# Patient Record
Sex: FEMALE | Race: WHITE | NOT HISPANIC OR LATINO | URBAN - METROPOLITAN AREA
[De-identification: names, ages, dates, MRNs, and addresses within clinical notes are randomized per-mention and may not be internally consistent; named-entity substitution may affect disease eponyms.]

---

## 2017-06-27 ENCOUNTER — OFFICE VISIT - HEALTHEAST (OUTPATIENT)
Dept: SURGERY | Facility: CLINIC | Age: 32
End: 2017-06-27

## 2017-06-27 ENCOUNTER — AMBULATORY - HEALTHEAST (OUTPATIENT)
Dept: LAB | Facility: CLINIC | Age: 32
End: 2017-06-27

## 2017-06-27 DIAGNOSIS — J45.909 ASTHMA: ICD-10-CM

## 2017-06-27 DIAGNOSIS — K91.2 POSTOPERATIVE MALABSORPTION: ICD-10-CM

## 2017-06-27 DIAGNOSIS — F17.200 TOBACCO USE DISORDER: ICD-10-CM

## 2017-06-27 DIAGNOSIS — E66.9 OBESITY (BMI 30-39.9): ICD-10-CM

## 2017-06-27 LAB
CHOLEST SERPL-MCNC: 151 MG/DL
FASTING STATUS PATIENT QL REPORTED: NO
HDLC SERPL-MCNC: 45 MG/DL
LDLC SERPL CALC-MCNC: 85 MG/DL
TRIGL SERPL-MCNC: 106 MG/DL

## 2017-06-27 ASSESSMENT — MIFFLIN-ST. JEOR: SCORE: 1993.16

## 2017-06-28 ENCOUNTER — COMMUNICATION - HEALTHEAST (OUTPATIENT)
Dept: SURGERY | Facility: CLINIC | Age: 32
End: 2017-06-28

## 2017-06-28 ENCOUNTER — AMBULATORY - HEALTHEAST (OUTPATIENT)
Dept: SURGERY | Facility: CLINIC | Age: 32
End: 2017-06-28

## 2017-06-28 DIAGNOSIS — K91.2 POSTOPERATIVE MALABSORPTION: ICD-10-CM

## 2017-06-28 LAB — HBA1C MFR BLD: 4.8 % (ref 4.2–6.1)

## 2017-07-14 ENCOUNTER — COMMUNICATION - HEALTHEAST (OUTPATIENT)
Dept: SURGERY | Facility: CLINIC | Age: 32
End: 2017-07-14

## 2017-07-18 ENCOUNTER — COMMUNICATION - HEALTHEAST (OUTPATIENT)
Dept: SURGERY | Facility: CLINIC | Age: 32
End: 2017-07-18

## 2017-07-19 ENCOUNTER — AMBULATORY - HEALTHEAST (OUTPATIENT)
Dept: SURGERY | Facility: CLINIC | Age: 32
End: 2017-07-19

## 2017-07-19 DIAGNOSIS — Z72.0 TOBACCO USE: ICD-10-CM

## 2017-08-05 ENCOUNTER — HEALTH MAINTENANCE LETTER (OUTPATIENT)
Age: 32
End: 2017-08-05

## 2017-11-15 ENCOUNTER — AMBULATORY - HEALTHEAST (OUTPATIENT)
Dept: SURGERY | Facility: CLINIC | Age: 32
End: 2017-11-15

## 2017-11-15 DIAGNOSIS — Z72.0 TOBACCO USE: ICD-10-CM

## 2018-03-14 ENCOUNTER — COMMUNICATION - HEALTHEAST (OUTPATIENT)
Dept: SURGERY | Facility: CLINIC | Age: 33
End: 2018-03-14

## 2018-03-14 DIAGNOSIS — J45.909 ASTHMA: ICD-10-CM

## 2018-03-17 ENCOUNTER — COMMUNICATION - HEALTHEAST (OUTPATIENT)
Dept: SURGERY | Facility: CLINIC | Age: 33
End: 2018-03-17

## 2018-03-17 DIAGNOSIS — J45.909 ASTHMA: ICD-10-CM

## 2018-08-13 ENCOUNTER — COMMUNICATION - HEALTHEAST (OUTPATIENT)
Dept: SURGERY | Facility: CLINIC | Age: 33
End: 2018-08-13

## 2018-08-13 DIAGNOSIS — K90.9 INTESTINAL MALABSORPTION: ICD-10-CM

## 2018-08-13 DIAGNOSIS — E66.9 OBESITY (BMI 30-39.9): ICD-10-CM

## 2018-08-13 DIAGNOSIS — K91.2 POSTOPERATIVE MALABSORPTION: ICD-10-CM

## 2018-08-16 ENCOUNTER — COMMUNICATION - HEALTHEAST (OUTPATIENT)
Dept: SURGERY | Facility: CLINIC | Age: 33
End: 2018-08-16

## 2018-08-16 ENCOUNTER — RECORDS - HEALTHEAST (OUTPATIENT)
Dept: ADMINISTRATIVE | Facility: OTHER | Age: 33
End: 2018-08-16

## 2019-04-23 ENCOUNTER — COMMUNICATION - HEALTHEAST (OUTPATIENT)
Dept: SURGERY | Facility: CLINIC | Age: 34
End: 2019-04-23

## 2020-08-11 ENCOUNTER — AMBULATORY - HEALTHEAST (OUTPATIENT)
Dept: SURGERY | Facility: CLINIC | Age: 35
End: 2020-08-11

## 2020-08-12 ENCOUNTER — OFFICE VISIT - HEALTHEAST (OUTPATIENT)
Dept: SURGERY | Facility: CLINIC | Age: 35
End: 2020-08-12

## 2020-08-12 ENCOUNTER — AMBULATORY - HEALTHEAST (OUTPATIENT)
Dept: LAB | Facility: CLINIC | Age: 35
End: 2020-08-12

## 2020-08-12 DIAGNOSIS — F17.200 TOBACCO USE DISORDER: ICD-10-CM

## 2020-08-12 DIAGNOSIS — K91.2 POSTOPERATIVE MALABSORPTION: ICD-10-CM

## 2020-08-12 DIAGNOSIS — Z72.0 TOBACCO USE: ICD-10-CM

## 2020-08-12 DIAGNOSIS — E66.01 MORBID OBESITY (H): ICD-10-CM

## 2020-08-12 LAB
ALBUMIN SERPL-MCNC: 3.5 G/DL (ref 3.5–5)
ALP SERPL-CCNC: 77 U/L (ref 45–120)
ALT SERPL W P-5'-P-CCNC: 20 U/L (ref 0–45)
ANION GAP SERPL CALCULATED.3IONS-SCNC: 11 MMOL/L (ref 5–18)
AST SERPL W P-5'-P-CCNC: 18 U/L (ref 0–40)
BILIRUB SERPL-MCNC: 0.4 MG/DL (ref 0–1)
BUN SERPL-MCNC: 9 MG/DL (ref 8–22)
CALCIUM SERPL-MCNC: 8.6 MG/DL (ref 8.5–10.5)
CHLORIDE BLD-SCNC: 107 MMOL/L (ref 98–107)
CO2 SERPL-SCNC: 21 MMOL/L (ref 22–31)
CREAT SERPL-MCNC: 0.71 MG/DL (ref 0.6–1.1)
ERYTHROCYTE [DISTWIDTH] IN BLOOD BY AUTOMATED COUNT: 13.3 % (ref 11–14.5)
FERRITIN SERPL-MCNC: 24 NG/ML (ref 10–130)
FOLATE SERPL-MCNC: 14.8 NG/ML
GFR SERPL CREATININE-BSD FRML MDRD: >60 ML/MIN/1.73M2
GLUCOSE BLD-MCNC: 93 MG/DL (ref 70–125)
HBA1C MFR BLD: 5.4 %
HCT VFR BLD AUTO: 40.6 % (ref 35–47)
HGB BLD-MCNC: 14.1 G/DL (ref 12–16)
MCH RBC QN AUTO: 29.8 PG (ref 27–34)
MCHC RBC AUTO-ENTMCNC: 34.8 G/DL (ref 32–36)
MCV RBC AUTO: 86 FL (ref 80–100)
PLATELET # BLD AUTO: 228 THOU/UL (ref 140–440)
PMV BLD AUTO: 8.6 FL (ref 7–10)
POTASSIUM BLD-SCNC: 4.1 MMOL/L (ref 3.5–5)
PROT SERPL-MCNC: 6.2 G/DL (ref 6–8)
PTH-INTACT SERPL-MCNC: 138 PG/ML (ref 10–86)
RBC # BLD AUTO: 4.73 MILL/UL (ref 3.8–5.4)
SODIUM SERPL-SCNC: 139 MMOL/L (ref 136–145)
TSH SERPL DL<=0.005 MIU/L-ACNC: 1.76 UIU/ML (ref 0.3–5)
VIT B12 SERPL-MCNC: 1000 PG/ML (ref 213–816)
WBC: 6.2 THOU/UL (ref 4–11)

## 2020-08-12 ASSESSMENT — MIFFLIN-ST. JEOR: SCORE: 2360.58

## 2020-08-13 LAB
25(OH)D3 SERPL-MCNC: 14 NG/ML (ref 30–80)
25(OH)D3 SERPL-MCNC: 14 NG/ML (ref 30–80)

## 2020-08-15 LAB
ANNOTATION COMMENT IMP: NORMAL
VIT A SERPL-MCNC: 0.51 MG/L (ref 0.3–1.2)
VIT B1 PYROPHOSHATE BLD-SCNC: 92 NMOL/L (ref 70–180)
VITAMIN A (RETINYL PALMITATE): <0.02 MG/L (ref 0–0.1)

## 2020-08-18 ENCOUNTER — COMMUNICATION - HEALTHEAST (OUTPATIENT)
Dept: SURGERY | Facility: CLINIC | Age: 35
End: 2020-08-18

## 2020-08-18 LAB — ZINC SERPL-MCNC: 62.1 UG/DL (ref 60–120)

## 2020-08-19 ENCOUNTER — COMMUNICATION - HEALTHEAST (OUTPATIENT)
Dept: SURGERY | Facility: CLINIC | Age: 35
End: 2020-08-19

## 2020-08-19 ENCOUNTER — OFFICE VISIT - HEALTHEAST (OUTPATIENT)
Dept: SURGERY | Facility: CLINIC | Age: 35
End: 2020-08-19

## 2020-08-19 DIAGNOSIS — E66.01 OBESITY, CLASS III, BMI 40-49.9 (MORBID OBESITY) (H): ICD-10-CM

## 2020-08-19 DIAGNOSIS — Z98.84 BARIATRIC SURGERY STATUS: ICD-10-CM

## 2020-08-19 DIAGNOSIS — Z71.3 NUTRITIONAL COUNSELING: ICD-10-CM

## 2020-08-19 ASSESSMENT — MIFFLIN-ST. JEOR: SCORE: 2346.97

## 2020-08-26 ENCOUNTER — HOSPITAL ENCOUNTER (OUTPATIENT)
Dept: RADIOLOGY | Facility: CLINIC | Age: 35
Discharge: HOME | End: 2020-08-26
Attending: FAMILY MEDICINE

## 2020-08-26 DIAGNOSIS — E66.01 MORBID OBESITY (H): ICD-10-CM

## 2020-09-24 LAB
CHLAMYDIA_EXT- HISTORICAL: NEGATIVE
SPECIMEN DESCRIPTION_EXT (HISTORICAL CONVERSION): NORMAL

## 2020-10-06 ENCOUNTER — COMMUNICATION - HEALTHEAST (OUTPATIENT)
Dept: SURGERY | Facility: CLINIC | Age: 35
End: 2020-10-06

## 2020-12-29 ENCOUNTER — COMMUNICATION - HEALTHEAST (OUTPATIENT)
Dept: SURGERY | Facility: CLINIC | Age: 35
End: 2020-12-29

## 2020-12-29 ENCOUNTER — OFFICE VISIT - HEALTHEAST (OUTPATIENT)
Dept: SURGERY | Facility: CLINIC | Age: 35
End: 2020-12-29

## 2020-12-29 DIAGNOSIS — K91.2 POSTOPERATIVE MALABSORPTION: ICD-10-CM

## 2020-12-29 DIAGNOSIS — E66.01 MORBID OBESITY (H): ICD-10-CM

## 2021-02-12 ENCOUNTER — AMBULATORY - HEALTHEAST (OUTPATIENT)
Dept: SURGERY | Facility: CLINIC | Age: 36
End: 2021-02-12

## 2021-02-12 DIAGNOSIS — Z11.59 ENCOUNTER FOR SCREENING FOR OTHER VIRAL DISEASES: ICD-10-CM

## 2021-02-18 LAB
HPV_EXT - HISTORICAL: ABNORMAL
PAP SMEAR - HIM PATIENT REPORTED: NORMAL

## 2021-02-23 ENCOUNTER — AMBULATORY - HEALTHEAST (OUTPATIENT)
Dept: LAB | Facility: CLINIC | Age: 36
End: 2021-02-23

## 2021-02-23 DIAGNOSIS — Z11.59 ENCOUNTER FOR SCREENING FOR OTHER VIRAL DISEASES: ICD-10-CM

## 2021-02-23 LAB
SARS-COV-2 PCR COMMENT: NORMAL
SARS-COV-2 RNA SPEC QL NAA+PROBE: NEGATIVE
SARS-COV-2 VIRUS SPECIMEN SOURCE: NORMAL

## 2021-02-23 ASSESSMENT — MIFFLIN-ST. JEOR: SCORE: 2365.11

## 2021-02-24 ENCOUNTER — COMMUNICATION - HEALTHEAST (OUTPATIENT)
Dept: SCHEDULING | Facility: CLINIC | Age: 36
End: 2021-02-24

## 2021-02-26 ENCOUNTER — ANESTHESIA - HEALTHEAST (OUTPATIENT)
Dept: SURGERY | Facility: CLINIC | Age: 36
End: 2021-02-26

## 2021-02-26 ENCOUNTER — SURGERY - HEALTHEAST (OUTPATIENT)
Dept: SURGERY | Facility: CLINIC | Age: 36
End: 2021-02-26

## 2021-02-26 ASSESSMENT — MIFFLIN-ST. JEOR: SCORE: 2356.89

## 2021-03-20 ENCOUNTER — COMMUNICATION - HEALTHEAST (OUTPATIENT)
Dept: SCHEDULING | Facility: CLINIC | Age: 36
End: 2021-03-20

## 2021-05-04 ENCOUNTER — AMBULATORY - HEALTHEAST (OUTPATIENT)
Dept: MULTI SPECIALTY CLINIC | Facility: CLINIC | Age: 36
End: 2021-05-04

## 2021-05-04 LAB
ALT SERPL W/O P-5'-P-CCNC: 29 IU/L (ref 8–45)
AST SERPL-CCNC: 23 IU/L (ref 2–40)
CREAT SERPL-MCNC: 0.74 MG/DL (ref 0.57–1.11)
GFR ESTIMATE EXT - HISTORICAL: >60 ML/MIN/1.73M2
GFR ESTIMATE, IF BLACK EXT - HISTORICAL: >60 ML/MIN/1.73M2

## 2021-05-26 ENCOUNTER — AMBULATORY - HEALTHEAST (OUTPATIENT)
Dept: SURGERY | Facility: CLINIC | Age: 36
End: 2021-05-26

## 2021-05-26 DIAGNOSIS — Z11.59 ENCOUNTER FOR SCREENING FOR OTHER VIRAL DISEASES: ICD-10-CM

## 2021-05-27 ENCOUNTER — RECORDS - HEALTHEAST (OUTPATIENT)
Dept: ADMINISTRATIVE | Facility: OTHER | Age: 36
End: 2021-05-27

## 2021-05-27 VITALS
DIASTOLIC BLOOD PRESSURE: 70 MMHG | SYSTOLIC BLOOD PRESSURE: 124 MMHG | HEIGHT: 71 IN | WEIGHT: 293 LBS | BODY MASS INDEX: 41.02 KG/M2

## 2021-05-30 ENCOUNTER — RECORDS - HEALTHEAST (OUTPATIENT)
Dept: ADMINISTRATIVE | Facility: CLINIC | Age: 36
End: 2021-05-30

## 2021-05-30 ENCOUNTER — HEALTH MAINTENANCE LETTER (OUTPATIENT)
Age: 36
End: 2021-05-30

## 2021-05-31 ENCOUNTER — RECORDS - HEALTHEAST (OUTPATIENT)
Dept: ADMINISTRATIVE | Facility: CLINIC | Age: 36
End: 2021-05-31

## 2021-05-31 VITALS — HEIGHT: 71 IN | WEIGHT: 265 LBS | BODY MASS INDEX: 37.1 KG/M2

## 2021-06-02 ENCOUNTER — RECORDS - HEALTHEAST (OUTPATIENT)
Dept: ADMINISTRATIVE | Facility: CLINIC | Age: 36
End: 2021-06-02

## 2021-06-02 ENCOUNTER — RECORDS - HEALTHEAST (OUTPATIENT)
Dept: ADMINISTRATIVE | Facility: OTHER | Age: 36
End: 2021-06-02

## 2021-06-03 ENCOUNTER — RECORDS - HEALTHEAST (OUTPATIENT)
Dept: ADMINISTRATIVE | Facility: OTHER | Age: 36
End: 2021-06-03

## 2021-06-04 ENCOUNTER — ANESTHESIA - HEALTHEAST (OUTPATIENT)
Dept: SURGERY | Facility: CLINIC | Age: 36
End: 2021-06-04

## 2021-06-04 ENCOUNTER — RECORDS - HEALTHEAST (OUTPATIENT)
Dept: ADMINISTRATIVE | Facility: OTHER | Age: 36
End: 2021-06-04

## 2021-06-04 ENCOUNTER — SURGERY - HEALTHEAST (OUTPATIENT)
Dept: SURGERY | Facility: CLINIC | Age: 36
End: 2021-06-04

## 2021-06-04 ASSESSMENT — MIFFLIN-ST. JEOR: SCORE: 2396.86

## 2021-06-10 NOTE — PROGRESS NOTES
"Pam Carrillo is a 35 y.o. female who is being evaluated via a billable telephone visit.      The patient has been notified of following:     \"This telephone visit will be conducted via a call between you and your physician/provider. We have found that certain health care needs can be provided without the need for a physical exam.  This service lets us provide the care you need with a short phone conversation.  If a prescription is necessary we can send it directly to your pharmacy.  If lab work is needed we can place an order for that and you can then stop by our lab to have the test done at a later time.    If during the course of the call the physician/provider feels a telephone visit is not appropriate, you will not be charged for this service.\"     Pam Carrillo complains of  No chief complaint on file.      I have reviewed and updated the patient's Past Medical History, Social History, Family History and Medication List.    ALLERGIES  Amoxicillin; Penicillins; Venom-honey bee; Erythromycin base; Neosporin (hnf-pye-mkmwe) [neomycin-bacitracin-polymyxin]; Prilocaine; Codeine; Oxycodone; and Vicodin [hydrocodone-acetaminophen]    Additional provider notes:     Post-op Surgical Weight Loss Diet Evaluation     Assessment:  Pt presents for 6 years post-op RD visit, s/p RYGB on 12-17-14 with Dr. Duron. Today we reviewed current eating habits and level of physical activity, and instructed on the changes that are required for successful bariatric outcomes.    Patient Progress: patient states she knows what to eat for the most part- would like to work on increasing protein and decreasing calories  +taking phentermine    Pt's Initial Weight: 346 lbs  Weight: (!) 343 lb (155.6 kg)  Weight loss from initial: 3  % Weight loss: 0.87 %      Body mass index is 47.84 kg/m .     Concerns: inadequate protein, eating quickly, high caffeine intake       Vitamins- hasn't started taking again yet- needs to go to the " "pharmacy    Do you experience hunger? Yes- mostly out of boredom  Do you have \"dumping\" syndrome?yes    How often?1-2 times per month   With what foods: varies-pasta, dairy, etc.   Do you experience any reflux or discomfort with eating? yes  -Are you still taking omeprazole? no  Nausea: yes- a lot  Vomiting: yes  Diarrhea: yes  Constipation: no  Hair loss:no    Diet Recall/Time:   Breakfast: soup with 4 crackers  Am Snack: multi-grain chips with salsa  Lunch: bowl of soup and sandwich- meat and cheese on a round bun  Pm snack: pistachios  Dinner: small handful of air-fried fries, 4 chicken nuggets  HS Snack: 3 gummy worms    Proteins/Veg/Fruits/CHO (NOT well tolerated): dairy, pasta sauces, eggs    Estimated protein intake: 40 grams    Estimated portion size per meals:1/2-1 cup/meal    Meals per week away from home: maybe 1 time per week    Meal Duration:10 min    Fluid-meal separation:  Fluids are not  30min before and 30 minutes after meals.    Fluid Intake  Water: 2-3 cups  Alcohol: mixed drink with monster zero   Carbonation: diet mt dew- 3-4 cups    Exercise: yes- you tube videos, walking, yard work      PES statement:      (NC-1.4) Altered GI Function related to Alteration in gastrointestinal tract structure and/or function/ Decreased functional length of the GI tract as evidenced by Gastric bypass surgery  (NB-1.7) Undesirable food choices related to Failure to adjust for lifestyle changes as evidenced by low protein intake at meals; large portions; skipping meals; frequent grazing;  mindless eating ; drinking with meals, not chewing each bite to applesauce consistency; consuming caffeine/carbonation daily, frequent restaurant intake; and no structured activity regimen  (NI-5.7.1) Inadequate protein intake related to Gastric bypass causing increased nutrient needs due to malabsorption/ Decreased ability to consume sufficient protein as evidenced by Edema, poor musculature, dull skin, thin and fragile " "hair; and Estimated intake of protein insufficient to meet requirements    Intervention    Discussion  1. Discussed 18 months and beyond Post-Op Nutritional Guidelines for RYGB  2. Recommended to consume 15-20gm protein at 3 meals daily, along with protein supplement/\"planned protein containing snack\" of 15-30gm protein, to reach goal of 60-80 gm protein daily.  3. Educated on post-op vitamin regimen: Multi Vit + iron 2x/day, calcium citrate 400-600 mg 2x/day, 0159-8218 mcg of Sublingual B-12 daily, and 5000 IU Vitamin D3 daily (MVI and calcium can be taken at the same time BID)  4. Reviewed lean protein sources  5. Bariatric Plate Method-  including lean/low fat protein at each meal, including a vegetable/fruit, and limiting carbohydrate intake to less than 25% of plate volume.   Instructions  1. Include 15-20gm protein at each meal, along with protein supplement/\"planned protein containing snack\" of 15-30gm protein, to reach goal of 60-80 gm protein daily.  2. Increase fluid intake to 64oz daily: choose plain or calorie/carbonation-free beverages.  3. Incorporate daily structured activity, 30-60 minutes most days of the week  4. Recommended pt to start taking: Multi Vit + iron 2x/day, calcium citrate 400-600 mg 2x/day, 6135-4144 mcg of Sublingual B-12 daily, and 5000 IU Vitamin D3 daily. (MVI and calcium can be taken at the same time)  5. Read food labels more consistently: keeping total fat grams <10, total sugar grams <10, fiber >3gm per serving.  6. Increase vegetable/fruit intake, by having a vegetable or fruit with each meal daily.  7. Practice plate method: 1/2 plate lean/low fat protein source, vegetable/fruit, <25% of plate complex carbohydrates.  8. Separate fluids 30 minutes before/after meal times.  9. Practice eating off of smaller plates/bowls, chewing to applesauce consistency, taking 20-30 minutes to eat in a calm/relaxed environment without distractions of tv/email/cell phone.    Personal " Goals:  1. Eat 20-30g protein per meal  2. Eat slowly  3. Three meals with no snacks    Handouts provided:  18 months and beyond Post-Op Nutritional Guidelines for RYGB  Lean Protein sources    Assessment/Plan:    Pt to follow up in 2 months with RD    Phone call duration: 30 minutes    Francesca Dennis RD

## 2021-06-10 NOTE — PATIENT INSTRUCTIONS - HE
[facility] Bariatric Care  Nutritional Guidelines  Gastric Bypass 18 Months Post Op and Beyond    General Guidelines and Helpful Hints:    Eat 3 meals per day + protein supplement(s). No snacks between meals.  o Do not skip meals.  This can cause overeating at the next meal and will prevent adequate protein and nutritional intake.    Aim for 60-80 grams of protein per day.  o Always eat your protein first. This assists with optimal nutrition and helps you stay full longer.  o Depending on your portion size, you may need to drink approved protein supplement between meals to achieve protein goals. Follow recommendations of your Dietitian.     Eat your protein first, and then follow with fiber.   o It is not necessary to count your fiber, but 15-20 grams per day is recommended.    o Add fiber by including fruits, vegetables, whole grains, and beans.     Portions should remain about 1 cup per meal. Use measuring cups to be accurate.    Continue to use saucer/salad plates, infant/toddler silverware to keep portion sizes small and take small bites.    Eat S-L-O-W-L-Y to make each meal last 20-30 minutes. Always stop eating when satisfied.    Continue to use caution with foods containing skins, peels or membranes. Chew well!    Aim for 64 oz. of calorie-free fluids daily.  o Continue to avoid caffeine and carbonation. If you choose to drink alcohol, do so in moderation.   o Remember to avoid drinking during meals, 15-30 minutes before and 30 minutes after.    Exercise is armstrong for continued weight loss and weight maintenance. Aim for 30-60 minutes of physical activity most days of the week. Include cardiovascular and strength training.    If having trouble tolerating meat, try using a crock-pot, tinfoil tent, steamer or other moist cooking method to create tender meats. Add broth or low-fat gravy to help meat stay moist.     Avoid high sugar and high fat foods to prevent dumping syndrome.  o Check nutrition labels for less  than 10 grams of sugar and less than 10 grams of fat per serving.    Continue Taking Vitamins/Minerals:  o 7891-2153 mcg of Sublingual B-12 daily  o 1 Multivitamin with Iron twice daily (chewable or swallow tabs)  o 500-600 mg Calcium Citrate twice daily (chewable or swallow tabs)  o 5000 IU Vitamin D3 daily    Sample Grocery List    Protein:    Fat free Greek or light yogurt (less than 10 grams sugar)    Fat free or low-fat cottage cheese    String cheese or reduced fat cheese slices    Tuna, salmon, crab, egg, or chicken salad made with light or fat free mayonnaise    Egg or Egg Substitute    Lean/extra lean turkey, beef, bison, venison (ground, sirloin, round, flank)    Pork loin or tenderloin (grilled, baked, broiled)    Fish such as salmon, tuna, trout, tilapia, etc. (grilled, baked, broiled)    Tender cuts of lean (skinless) turkey or chicken    Lean deli meats: turkey, lean ham, chicken, lean roast beef    Beans such as kidney, garbanzo, black, lambert, or low-fat/fat free refried beans    Peanut butter (natural preferred). Limit to 1 Tbsp. per day.    Low-fat meatloaf (made with lean ground beef or turkey)    Sloppy Joes made with low-sugar ketchup and lean ground beef or turkey    Soy or vegetable protein (i.e. vegan crumbles, soy/veggie burger, tofu)    Hummus    Vegetables:    Fresh: cooked or raw (as tolerated)    Frozen vegetables    Canned vegetables (low sodium or no salt added, rinse before cooking/eating)    (Ok to have skins/peels/membranes/seeds - just chew well)    Fruits:    Fresh fruit    Frozen fruit (no sugar added)    Canned fruit (packed in its own juice, NOT syrup)    (Ok to have skins/peels/membranes/seeds - just chew well)    Starch:    Unsweetened whole-grain hot cereal (or high fiber cold cereal, dry)    Toasted whole wheat bread or [place] Thins    Whole grain crackers    Baked /boiled/mashed potato/sweet potato    Cooked whole grain pasta, brown rice, or other cooked whole  grains    Starchy vegetables: corn, peas, winter squash    Protein Supplement:     Ready to drink protein shake with:  o 15-30 grams protein per serving  o Less than 10 grams total carbohydrate per serving     Protein powder mixed with:  o  Skim or 1% milk  o Low fat or fat free Lactaid milk, plain or no sugar added soymilk  o Water     Fats: (use in moderation)    1 teaspoon of soft tub margarine    1 teaspoon olive oil, canola oil, or peanut oil    1 tablespoon of low-fat bernal or salad dressing     Sample Menu for 18+ months after Gastric Bypass    You do NOT need to eat/drink the full portion sizes listed below  Always stop when you are satisfied    Breakfast   cup 1% cottage cheese     cup mixed berries   Lunch 2 oz lean roast beef on   [place] Thin with 1 tsp. light bernal    small tomato, chopped, mixed with 1 tsp. light vinaigrette dressing   Supplement Approved protein supplement (if needed between meals)   Dinner 2 oz grilled salmon    cup salad greens with 1 tsp. light salad dressing and 1 tsp. ground flax seed    cup quinoa or brown rice     Breakfast   cup egg substitute with   cup sautéed chopped vegetables  2 light [place] Krisp crackers   Lunch Tuna Melt:   cup tuna mixed with 1 tsp. light bernal over   [place] Thin. Top with 2-3 slices cucumber and 1 oz slice of low fat cheese   Supplement 1 cup skim milk (if needed between meals)   Dinner 3 oz  grilled, broiled, or baked seasoned skinless chicken breast    cup asparagus     Breakfast   cup plain oatmeal made with skim or 1% milk with 1 Tbsp. flavored/unflavored protein powder added  1 mozzarella string cheese   Lunch 2 oz deli turkey breast  1/3 cup salad with 1 tsp. light salad dressing, 1/8 of a whole avocado and 1 Tbsp. sunflower seeds   Dinner 3 oz. pork loin made in a crock pot, seasoned with a spice rub    cup cooked carrots   Supplement Approved protein supplement (if needed between meals)     Breakfast 1 cup breakfast casserole made with egg  substitute, turkey sausage,  and steamed, chopped bell peppers   Supplement  1 cup light Greek yogurt (if needed between meals)   Lunch 2 oz. teriyaki turkey    cup mashed sweet potato with 1-2 spritzes of spray butter (like [place])    cup fresh pineapple   Dinner 3 oz low fat meatloaf    cup roasted garlic zucchini     Breakfast   cup leftover breakfast casserole    cup no sugar added applesauce with 1 Tbsp. unflavored protein powder and a sprinkle of cinnamon    Lunch 3 oz shrimp with 1-2 Tbsp. low-sugar cocktail sauce for dipping    c. whole wheat pasta drizzled with   tsp. olive oil   Supplement 1 cup skim/1% milk with scoop of protein powder (if needed between meals)   Dinner Grilled, seasoned kebob with 2 oz lean beef and   cup vegetables     Breakfast Breakfast pizza:   [place] Thin spread with 1 Tbsp. low sugar spaghetti sauce,   cup shredded low fat cheese, melted and 1 slice of Sammarinese yap     cup fresh fruit mixed with chopped almonds   Lunch   cup black bean soup  4-5 whole grain crackers   Dinner 3 oz  tilapia with lemon pepper seasoning    cup stewed tomatoes   Supplement 1 string cheese (if needed between meals)     Breakfast 2 hard boiled eggs (discard 1 egg yolk)    whole wheat English Muffin with 1 tsp. low sugar jelly   Lunch   cup leftover black bean soup topped with 1-2 Tbsp. low fat cheese  2-3 light [place] Krisp crackers   Supplement Approved protein supplement (if needed between meals)   Dinner 3 oz sirloin steak    cup steamed broccoli

## 2021-06-11 NOTE — PROGRESS NOTES
Bariatric Care Clinic Follow Up Visit for Previous Bariatric Surgery   Date of visit: 6/27/2017  Physician: Kaylee Soto MD  Primary Care is Ora Luna MD.  Pam Herrera   32 y.o.  female    Date of Surgery: 12/17/14  Initial Weight: 389#  Today's Weight:   Wt Readings from Last 1 Encounters:   06/27/17 (!) 265 lb (120.2 kg)     Body mass index is 36.96 kg/(m^2).  Initial Weight: 393 lbs  Weight: 265 lb (120.2 kg)  Weight loss from initial: 128  % Weight loss: 32.57 %     Assessment and Plan   Assessment: Pam is a 32 y.o. year old female who is 2 years s/p  Ashu en Y Gastric Bypass with Dr. Warren.  They have had a durable weight loss of 124# lbs since surgery.  Overall compliance with the [facility] Program has been very poor.    Pam Herrera feels that she has not achieved the goal(s) identified pre-operatively.      Plan:    1. Postoperative malabsorption  Pt is not taking any vitamins. I ordered routine labs. I reviewed recommended vitamins after bariatric surgery and written information was given.     2. Obesity (BMI 30-39.9)  Pt has lost a significant amount of weight but she is not happy. She will follow up with our dietician for further instructions. I did review lifestyle changes to assist with weight loss.    3. Tobacco use disorder  I encouraged her to quit. I explained that tobacco and NSAIDS can lead to ulcers. I prescribed chantix.          >35 min spent with patient, >50 % spent in counseling and coordination of care     Bariatric Surgery Review   Interim History/LifeChanges: no significant    Patient Concerns: She is getting rashes under her belly fold, her energy is down    Medication changes: none    Vitamin Intake:   Multivitamin   occasional   Vitamin D  none   Calcium  none   Vit. B-12    none     Habits:            Alcohol Intake  occasional   NSAID Use  sometimes   Caffeine Use  yes   Exercise  runs/walks 30 min 3 x per week, bikes 20 min 3-4 x per  week, yoga 1 x per week   CPAP Use:  na   Birth Control  condoms   Tobacco Use     Yes, 1/2 pack per day       MBSAQIP  Surgeon: NESSA Warren MD  Anticoag for PE/DVT since last visit: No  Patient complains of hernia: No  Readmit since last visit: No  Reoperation since last visit: No  Vomiting: Weekly  GERD req medication: No  Sleep Apnea: No  Hypertension req meds: No  Hyperlipidemia req meds: No  Diabetes: No    Symptoms  Hair Loss: No  Reactive Hypoglycemia: Yes  Abdominal Pain: No  Nausea: No  Heartburn: No  Constipation: No  Diarrhea: No  Trouble Breathing or Chest Pain: No  Leg Swelling: No  Skin rashes under folds: Yes                         LABS: ordered      LABS:  Lab Results   Component Value Date    WBC 8.9 12/01/2014    HGB 14.2 12/01/2014    HCT 40.5 12/01/2014    MCV 91 12/01/2014     12/01/2014      Lab Results   Component Value Date    XDYSFCJB92GN 11.9 (L) 12/01/2014    Lab Results   Component Value Date    HGBA1C 6.7 (H) 12/01/2014      Lab Results   Component Value Date    CHOL 187 12/01/2014    Lab Results   Component Value Date    PTH 65 12/01/2014         Lab Results   Component Value Date    FERRITIN 229 (H) 12/01/2014      Lab Results   Component Value Date    HDL 34 (L) 12/01/2014      Lab Results   Component Value Date    EPQANSTA13 483 12/01/2014    Lab Results   Component Value Date    00435 159 12/01/2014      Lab Results   Component Value Date    LDLCALC  12/01/2014      Comment:      Invalid, Triglycerides >300    Lab Results   Component Value Date    TSH 2.31 12/01/2014    Lab Results   Component Value Date    FOLATE >20.0 12/01/2014      Lab Results   Component Value Date    TRIG 324 (H) 12/01/2014    Lab Results   Component Value Date    ALT 40 12/01/2014    AST 36 12/01/2014    ALKPHOS 75 12/01/2014    BILITOT 0.4 12/01/2014    No results found for: TESTOSTERONE     No components found for: CHOLHDL Lab Results   Component Value Date    7597 60.0 12/01/2014       "@[facility](vitamin a: 1)@             Patient Profile   Social History     Social History Narrative        Past Medical History   Past Medical History:   Diagnosis Date     Arthritis     left knee     Fatty liver      Gallbladder sludge      Kidney stones      Morbid obesity      Tobacco use      Patient Active Problem List   Diagnosis     S/P bariatric surgery     Abdominal pain     Tobacco use     Other and unspecified postsurgical nonabsorption     Diarrhea     Kidney stones     Gallbladder sludge     Current Outpatient Prescriptions   Medication Sig     pediatric multivitamin-iron (POLY-VI-SOL WITH IRON) chewable tablet Chew 2 tablets daily.     calcium citrate-vitamin D3 500 mg calcium -400 unit Chew Chew 1 tablet 2 times a day at 6:00 am and 4:00 pm.     cholecalciferol, vitamin D3, (VITAMIN D3) 1,000 unit Chew Chew 5,000 mg daily.      cyanocobalamin, vitamin B-12, 1,000 mcg Subl Place 1,000 mcg under the tongue daily.       Past Surgical History  She has a past surgical history that includes Knee arthroscopy (Left, 10/2002); left patellar reallignment (2002 & 2003); Tonsillectomy; Wrist fracture surgery (Left, 1993); Dilation and curettage of uterus (2007); and lap gastric bypass/jared-en-y (N/A, 12/17/2014).     Examination   /64  Pulse 85  Resp 16  Ht 5' 11\" (1.803 m)  Wt (!) 265 lb (120.2 kg)  BMI 36.96 kg/m2  Height: 5' 11\" (1.803 m) (6/27/2017 12:59 PM)  Initial Weight: 393 lbs (6/27/2017 12:59 PM)  Weight: 265 lb (120.2 kg) (6/27/2017 12:59 PM)  Weight loss from initial: 128 (6/27/2017 12:59 PM)  % Weight loss: 32.57 % (6/27/2017 12:59 PM)  BMI (Calculated): 37 (6/27/2017 12:59 PM)  NSAIDS: Yes (6/27/2017 12:59 PM)  Pain Scale: 4 (6/27/2017 12:59 PM)  General:  Alert and ambulatory,   HEENT:  No conjunctival pallor, moist mucous Membranes, neck is without LAD  Pulmonary:  Normal respiratory effort, no cough, no audible wheezes/crackles.  CV:  Regular rate and Rhythm, no murmurs  Abdominal: " Scars well healed, BS normal,soft, NT without rebound or guarding  Extremities: no edema  Skin:  No rashes  Pscyh/Mood: stable         Counseling:   We reviewed the important post op bariatric recommendations:  -eating 3 meals daily  -eating protein first, getting >60gm protein daily  -eating slowly, chewing food well  -avoiding/limiting calorie containing beverages  -drinking water 15-30 minutes before or after meals  -choosing wheat, not white with breads, crackers, pastas, no, bagels, tortillas, rice  -limiting restaurant or cafeteria eating to twice a week or less    We discussed the importance of restorative sleep and stress management in maintaining a healthy weight.  We discussed the National Weight Control Registry healthy weight maintenance strategies and ways to optimize metabolism.  We discussed the importance of physical activity including cardiovascular and strength training in maintaining a healthier weight.  We discussed the importance of life-long vitamin supplementation and life-long  follow-up.    Pam was reminded that, to avoid marginal ulcers she should avoid tobacco at all, alcohol in excess, caffeine in excess, and NSAIDS (unless indicated for cardioprotection or othewise and opposed by a PPI).    LUCRETIA Soto MD  [facility].  6/27/2017  1:18 PM

## 2021-06-11 NOTE — PROGRESS NOTES
Here for 2.5 yrs s/p RNY f/u visit.  Pt was not seen at 2 yrs and didn't have labs ordered prior to this visit.  Pt is not taking vitamins for about 2 yrs besides her MVI once in awhile.  Pt has numbness and tingling in her fingers and toes.  She is feeling tired a lot as well and says she gets dizzy at times.       Lakesha Bloom RN, CBN  [facility]  P [phone]  F [phone]

## 2021-06-12 NOTE — TELEPHONE ENCOUNTER
Attempted to call x2 for phone visit at 8am. LVM to call back and reschedule at earliest convenience.

## 2021-06-14 NOTE — TELEPHONE ENCOUNTER
Called Pam to help make a 3 mo follow up with Dr. Soto. Was not available so I left a message for her to call us back and schedule.    Sarah Slaughter CMA ([facility])    [facility]    Ph. [phone]  F. [phone]

## 2021-06-14 NOTE — PATIENT INSTRUCTIONS - HE
[facility]  Nutritional Guidelines  Gastric Bypass 18 Months Post Op and Beyond    General Guidelines and Helpful Hints:    Eat 3 meals per day + protein supplement(s). No snacks between meals.  o Do not skip meals.  This can cause overeating at the next meal and will prevent adequate protein and nutritional intake.    Aim for 60-80 grams of protein per day.  o Always eat your protein first. This assists with optimal nutrition and helps you stay full longer.  o Depending on your portion size, you may need to drink approved protein supplement between meals to achieve protein goals. Follow recommendations of your Dietitian.     Eat your protein first, and then follow with fiber.   o It is not necessary to count your fiber, but 15-20 grams per day is recommended.    o Add fiber by including fruits, vegetables, whole grains, and beans.     Portions should remain about 1 cup per meal. Use measuring cups to be accurate.    Continue to use saucer/salad plates, infant/toddler silverware to keep portion sizes small and take small bites.    Eat S-L-O-W-L-Y to make each meal last 20-30 minutes. Always stop eating when satisfied.    Continue to use caution with foods containing skins, peels or membranes. Chew well!    Aim for 64 oz. of calorie-free fluids daily.  o Continue to avoid caffeine and carbonation. If you choose to drink alcohol, do so in moderation.   o Remember to avoid drinking during meals, 15-30 minutes before and 30 minutes after.    Exercise is armstrong for continued weight loss and weight maintenance. Aim for 30-60 minutes of physical activity most days of the week. Include cardiovascular and strength training.    If having trouble tolerating meat, try using a crock-pot, tinfoil tent, steamer or other moist cooking method to create tender meats. Add broth or low-fat gravy to help meat stay moist.     Avoid high sugar and high fat foods to prevent dumping syndrome.  o Check nutrition labels for less  than 10 grams of sugar and less than 10 grams of fat per serving.    Continue Taking Vitamins/Minerals:  o 9316-3192 mcg of Sublingual B-12 daily  o 1 Multivitamin with Iron twice daily (chewable or swallow tabs)  o 500-600 mg Calcium Citrate twice daily (chewable or swallow tabs)  o 5000 IU Vitamin D3 daily    Sample Grocery List    Protein:    Fat free Greek or light yogurt (less than 10 grams sugar)    Fat free or low-fat cottage cheese    String cheese or reduced fat cheese slices    Tuna, salmon, crab, egg, or chicken salad made with light or fat free mayonnaise    Egg or Egg Substitute    Lean/extra lean turkey, beef, bison, venison (ground, sirloin, round, flank)    Pork loin or tenderloin (grilled, baked, broiled)    Fish such as salmon, tuna, trout, tilapia, etc. (grilled, baked, broiled)    Tender cuts of lean (skinless) turkey or chicken    Lean deli meats: turkey, lean ham, chicken, lean roast beef    Beans such as kidney, garbanzo, black, lambert, or low-fat/fat free refried beans    Peanut butter (natural preferred). Limit to 1 Tbsp. per day.    Low-fat meatloaf (made with lean ground beef or turkey)    Sloppy Joes made with low-sugar ketchup and lean ground beef or turkey    Soy or vegetable protein (i.e. vegan crumbles, soy/veggie burger, tofu)    Hummus    Vegetables:    Fresh: cooked or raw (as tolerated)    Frozen vegetables    Canned vegetables (low sodium or no salt added, rinse before cooking/eating)    (Ok to have skins/peels/membranes/seeds - just chew well)    Fruits:    Fresh fruit    Frozen fruit (no sugar added)    Canned fruit (packed in its own juice, NOT syrup)    (Ok to have skins/peels/membranes/seeds - just chew well)    Starch:    Unsweetened whole-grain hot cereal (or high fiber cold cereal, dry)    Toasted whole wheat bread or [place] Thins    Whole grain crackers    Baked /boiled/mashed potato/sweet potato    Cooked whole grain pasta, brown rice, or other cooked whole  grains    Starchy vegetables: corn, peas, winter squash    Protein Supplement:     Ready to drink protein shake with:  o 15-30 grams protein per serving  o Less than 10 grams total carbohydrate per serving     Protein powder mixed with:  o  Skim or 1% milk  o Low fat or fat free Lactaid milk, plain or no sugar added soymilk  o Water     Fats: (use in moderation)    1 teaspoon of soft tub margarine    1 teaspoon olive oil, canola oil, or peanut oil    1 tablespoon of low-fat bernal or salad dressing     Sample Menu for 18+ months after Gastric Bypass    You do NOT need to eat/drink the full portion sizes listed below  Always stop when you are satisfied    Breakfast   cup 1% cottage cheese     cup mixed berries   Lunch 2 oz lean roast beef on   [place] Thin with 1 tsp. light bernal    small tomato, chopped, mixed with 1 tsp. light vinaigrette dressing   Supplement Approved protein supplement (if needed between meals)   Dinner 2 oz grilled salmon    cup salad greens with 1 tsp. light salad dressing and 1 tsp. ground flax seed    cup quinoa or brown rice     Breakfast   cup egg substitute with   cup sautéed chopped vegetables  2 light [place] Krisp crackers   Lunch Tuna Melt:   cup tuna mixed with 1 tsp. light bernal over   [place] Thin. Top with 2-3 slices cucumber and 1 oz slice of low fat cheese   Supplement 1 cup skim milk (if needed between meals)   Dinner 3 oz  grilled, broiled, or baked seasoned skinless chicken breast    cup asparagus     Breakfast   cup plain oatmeal made with skim or 1% milk with 1 Tbsp. flavored/unflavored protein powder added  1 mozzarella string cheese   Lunch 2 oz deli turkey breast  1/3 cup salad with 1 tsp. light salad dressing, 1/8 of a whole avocado and 1 Tbsp. sunflower seeds   Dinner 3 oz. pork loin made in a crock pot, seasoned with a spice rub    cup cooked carrots   Supplement Approved protein supplement (if needed between meals)     Breakfast 1 cup breakfast casserole made with egg  substitute, turkey sausage,  and steamed, chopped bell peppers   Supplement  1 cup light Greek yogurt (if needed between meals)   Lunch 2 oz. teriyaki turkey    cup mashed sweet potato with 1-2 spritzes of spray butter (like [place])    cup fresh pineapple   Dinner 3 oz low fat meatloaf    cup roasted garlic zucchini     Breakfast   cup leftover breakfast casserole    cup no sugar added applesauce with 1 Tbsp. unflavored protein powder and a sprinkle of cinnamon    Lunch 3 oz shrimp with 1-2 Tbsp. low-sugar cocktail sauce for dipping    c. whole wheat pasta drizzled with   tsp. olive oil   Supplement 1 cup skim/1% milk with scoop of protein powder (if needed between meals)   Dinner Grilled, seasoned kebob with 2 oz lean beef and   cup vegetables     Breakfast Breakfast pizza:   [place] Thin spread with 1 Tbsp. low sugar spaghetti sauce,   cup shredded low fat cheese, melted and 1 slice of [place] yap     cup fresh fruit mixed with chopped almonds   Lunch   cup black bean soup  4-5 whole grain crackers   Dinner 3 oz  tilapia with lemon pepper seasoning    cup stewed tomatoes   Supplement 1 string cheese (if needed between meals)     Breakfast 2 hard boiled eggs (discard 1 egg yolk)    whole wheat English Muffin with 1 tsp. low sugar jelly   Lunch   cup leftover black bean soup topped with 1-2 Tbsp. low fat cheese  2-3 light [place] Krisp crackers   Supplement Approved protein supplement (if needed between meals)   Dinner 3 oz sirloin steak    cup steamed broccoli

## 2021-06-14 NOTE — PROGRESS NOTES
"Pam Carrillo is a 35 y.o. female who is being evaluated via a billable telephone visit.      The patient has been notified of following:     \"This telephone visit will be conducted via a call between you and your physician/provider. We have found that certain health care needs can be provided without the need for a physical exam.  This service lets us provide the care you need with a short phone conversation.  If a prescription is necessary we can send it directly to your pharmacy.  If lab work is needed we can place an order for that and you can then stop by our lab to have the test done at a later time.    Telephone visits are billed at different rates depending on your insurance coverage. During this emergency period, for some insurers they may be billed the same as an in-person visit.  Please reach out to your insurance provider with any questions.    If during the course of the call the physician/provider feels a telephone visit is not appropriate, you will not be charged for this service.\"    Patient has given verbal consent to a Telephone visit? Yes    What phone number would you like to be contacted at? [phone]    Patient would like to receive their AVS by AVS Preference: Shad.      Phone call duration: 14 minutes    Sarah Slaughter CMA      "

## 2021-06-15 NOTE — ANESTHESIA PROCEDURE NOTES
Peripheral Block    Patient location during procedure: pre-op  Start time: 2/26/2021 12:55 PM  End time: 2/26/2021 12:58 PM  post-op analgesia per surgeon order as noted in medical record  Staffing:  Performing  Anesthesiologist: Josephine Wade MD  Preanesthetic Checklist  Completed: patient identified, site marked, risks, benefits, and alternatives discussed, timeout performed, consent obtained, airway assessed, oxygen available, suction available, emergency drugs available and hand hygiene performed  Peripheral Block  Block type: femoral  Prep: ChloraPrep  Patient position: supine  Patient monitoring: blood pressure, heart rate, continuous pulse oximetry and cardiac monitor  Laterality: right  Injection technique: ultrasound guided    Ultrasound used to visualize needle placement in proximity to nerve being blocked: yes   US used to visualize anesthetic spread    Permanent ultrasound image captured for medical record  Sterile gel and probe cover used for ultrasound.  Needle  Needle type: Stimuplex   Needle gauge: 20G  Needle length: 4 in  no peripheral nerve catheter placed  Assessment  Injection assessment: no difficulty with injection, negative aspiration for heme, no paresthesia on injection and incremental injection

## 2021-06-15 NOTE — ANESTHESIA POSTPROCEDURE EVALUATION
Patient: Pam Carrillo  Procedure(s):  RIGHT KNEE ARTHROSCOPY (Right)  OPEN MEDIAL PATELLOFEMORAL LIGAMENT RECONSTRUCTION WITH GRACILIS ALLOGRAFT (Right)  Anesthesia type: general    Patient location: PACU  Last vitals:   Vitals Value Taken Time   /84 02/26/21 1530   Temp 35.7  C (96.2  F) 02/26/21 1500   Pulse 95 02/26/21 1530   Resp 16 02/26/21 1530   SpO2 94 % 02/26/21 1530     Post vital signs: stable  Level of consciousness: awake and responds to simple questions  Post-anesthesia pain: pain controlled  Post-anesthesia nausea and vomiting: no  Pulmonary: unassisted, return to baseline  Cardiovascular: stable and blood pressure at baseline  Hydration: adequate  Anesthetic events: no    QCDR Measures:  ASA# 11 - Naomi-op Cardiac Arrest: ASA11B - Patient did NOT experience unanticipated cardiac arrest  ASA# 12 - Naomi-op Mortality Rate: ASA12B - Patient did NOT die  ASA# 13 - PACU Re-Intubation Rate: ASA13B - Patient did NOT require a new airway mgmt  ASA# 10 - Composite Anes Safety: ASA10A - No serious adverse event    Additional Notes:

## 2021-06-15 NOTE — ANESTHESIA PREPROCEDURE EVALUATION
Anesthesia Evaluation      Patient summary reviewed   No history of anesthetic complications     Airway   Mallampati: II  Neck ROM: full   Pulmonary - normal exam   (+) asthma                           Cardiovascular - normal exam   Neuro/Psych      Endo/Other    (+) obesity,      GI/Hepatic/Renal            Dental - normal exam                        Anesthesia Plan  Planned anesthetic: general endotracheal and peripheral nerve block    ASA 4   Induction: intravenous   Anesthetic plan and risks discussed with: patient  Anesthesia plan special considerations: antiemetics,   Post-op plan: routine recovery

## 2021-06-15 NOTE — ANESTHESIA CARE TRANSFER NOTE
Last vitals:   Vitals:    02/26/21 1500   BP: 156/85   Pulse: 97   Resp: 16   Temp: (!) 35.7  C (96.2  F)   SpO2: 100%     Patient's level of consciousness is drowsy  Spontaneous respirations: yes  Maintains airway independently: yes  Dentition unchanged: yes  Oropharynx: oropharynx clear of all foreign objects    QCDR Measures:  ASA# 20 - Surgical Safety Checklist: WHO surgical safety checklist completed prior to induction    PQRS# 430 - Adult PONV Prevention: 4558F - Pt received => 2 anti-emetic agents (different classes) preop & intraop  ASA# 8 - Peds PONV Prevention: NA - Not pediatric patient, not GA or 2 or more risk factors NOT present  PQRS# 424 - Naomi-op Temp Management: 4559F - At least one body temp DOCUMENTED => 35.5C or 95.9F within required timeframe  PQRS# 426 - PACU Transfer Protocol: - Transfer of care checklist used  ASA# 14 - Acute Post-op Pain: ASA14B - Patient did NOT experience pain >= 7 out of 10

## 2021-06-16 PROBLEM — K76.0 FATTY LIVER DISEASE, NONALCOHOLIC: Status: ACTIVE | Noted: 2020-08-12

## 2021-06-16 PROBLEM — E66.01 MORBID OBESITY (H): Status: ACTIVE | Noted: 2020-12-28

## 2021-06-16 NOTE — TELEPHONE ENCOUNTER
Patient calling - says she has asthma and was recently diagnosed with COVID19.  Says her chest started hurting today and is getting worse.  Says it feels like when she has had pneumonia in the past.      Triaged to disposition of Go to ED Now.    Fabiola Wharton, RN  Triage Nurse Advisor    COVID 19 Nurse Triage Plan/Patient Instructions    Please be aware that novel coronavirus (COVID-19) may be circulating in the community. If you develop symptoms such as fever, cough, or SOB or if you have concerns about the presence of another infection including coronavirus (COVID-19), please contact your health care provider or visit  [email or web address].    Disposition/Instructions    ED Visit recommended. Follow protocol based instructions.      Bring Your Own Device:  Please also bring your smart device(s) (smart phones, tablets, laptops) and their charging cables for your personal use and to communicate with your care team during your visit.      Thank you for taking steps to prevent the spread of this virus.  o Limit your contact with others.  o Wear a simple mask to cover your cough.  o Wash your hands well and often.    Resources    [facility]: About COVID-19: [email or web address]    CDC: What to Do If You're Sick: www.cdc.gov/coronavirus/2019-ncov/about/steps-when-sick.html    CDC: Ending Home Isolation: www.cdc.gov/coronavirus/2019-ncov/hcp/disposition-in-home-patients.html     CDC: Caring for Someone: www.cdc.gov/coronavirus/2019-ncov/if-you-are-sick/care-for-someone.html     [facility]: Interim Guidance for Hospital Discharge to Home: www.health.[facility].[place]/diseases/coronavirus/hcp/hospdischarge.pdf    [facility] clinical trials (COVID-19 research studies): [email or web address]/um-clinical-trials     Below are the COVID-19 hotlines at the [place] Department of Health ([facility]). Interpreters are available.   o For health questions: Call 857-410-0018 or 1-178.589.7987 (7 a.m. to 7  p.m.)  o For questions about schools and childcare: Call 537-907-4056 or 1-168.469.9984 (7 a.m. to 7 p.m.)       Reason for Disposition    SEVERE or constant chest pain or pressure (Exception: mild central chest pain, present only when coughing)    Additional Information    Negative: SEVERE difficulty breathing (e.g., struggling for each breath, speaks in single words)    Negative: Difficult to awaken or acting confused (e.g., disoriented, slurred speech)    Negative: Bluish (or gray) lips or face now    Negative: Shock suspected (e.g., cold/pale/clammy skin, too weak to stand, low BP, rapid pulse)    Negative: Sounds like a life-threatening emergency to the triager    Negative: [1] COVID-19 exposure AND [2] no symptoms    Negative: [1] Lives with someone known to have influenza (flu test positive) AND [2] flu-like symptoms (e.g., cough, runny nose, sore throat, SOB; with or without fever)    Negative: [1] Adult with possible COVID-19 symptoms AND [2] triager concerned about severity of symptoms or other causes    Negative: COVID-19 vaccine reaction suspected (e.g., fever, headache, muscle aches) occurring during days 1-3 after getting vaccine    Negative: COVID-19 vaccine, questions about    Negative: COVID-19 and breastfeeding, questions about    Protocols used: CORONAVIRUS (COVID-19) DIAGNOSED OR [id number] 1.3.21

## 2021-06-19 NOTE — LETTER
Letter by Deysi Rodriguez MD at      Author: Deysi Rodriguez MD Service: -- Author Type: --    Filed:  Encounter Date: 4/23/2019 Status: (Other)         Pam Herrera  [address] [place] [address]               April 23, 2019      Dear Pam:    We are sorry that you missed your appointment with Dr. Hall on 4/23/2019. Your health and follow-up medical care are important to us. Please call our office as soon as possible so that we may reschedule your appointment. If you have already rescheduled your appointment, please disregard this letter.    Sincerely,        Deysi Hall MD

## 2021-06-26 NOTE — ANESTHESIA CARE TRANSFER NOTE
Last vitals:   Vitals:    06/04/21 1507   BP: 136/87   Pulse: 92   Resp: 12   Temp: 36.6  C (97.9  F)   SpO2: 100%     Patient's level of consciousness is drowsy  Spontaneous respirations: yes  Maintains airway independently: yes  Dentition unchanged: yes  Oropharynx: oropharynx clear of all foreign objects    QCDR Measures:  ASA# 20 - Surgical Safety Checklist: WHO surgical safety checklist completed prior to induction    PQRS# 430 - Adult PONV Prevention: 4558F - Pt received => 2 anti-emetic agents (different classes) preop & intraop  ASA# 8 - Peds PONV Prevention: NA - Not pediatric patient, not GA or 2 or more risk factors NOT present  PQRS# 424 - Naomi-op Temp Management: 4559F - At least one body temp DOCUMENTED => 35.5C or 95.9F within required timeframe  PQRS# 426 - PACU Transfer Protocol: - Transfer of care checklist used  ASA# 14 - Acute Post-op Pain: ASA14B - Patient did NOT experience pain >= 7 out of 10

## 2021-06-26 NOTE — ANESTHESIA PROCEDURE NOTES
Peripheral Block    Patient location during procedure: pre-op  Start time: 6/4/2021 1:30 PM  End time: 6/4/2021 1:35 PM    Staffing:  Performing  Anesthesiologist: Manjit Pillai MD  Preanesthetic Checklist  Completed: patient identified, site marked, risks, benefits, and alternatives discussed, timeout performed, consent obtained, airway assessed, oxygen available, suction available, emergency drugs available and hand hygiene performed  Peripheral Block  Block type: femoral  Prep: ChloraPrep  Patient position: supine  Patient monitoring: cardiac monitor, continuous pulse oximetry, heart rate and blood pressure  Laterality: left  Injection technique: ultrasound guided    Ultrasound used to visualize needle placement in proximity to nerve being blocked: yes   US used to visualize anesthetic spread  Visualized anatomic structures normal  No Pathological Findings  Permanent ultrasound image captured for medical record  Sterile gel and probe cover used for ultrasound.  Needle  Needle type: Stimuplex   Needle gauge: 21 G  Needle length: 4 in  no peripheral nerve catheter placed  Assessment  Injection assessment: no difficulty with injection, negative aspiration for heme, no paresthesia on injection and incremental injection

## 2021-06-26 NOTE — ANESTHESIA POSTPROCEDURE EVALUATION
Patient: Pam Carrillo  Procedure(s):  LEFT KNEE ARTHROSCOPY CHONDROPLASTY (Left)  OPEN MEDIAL PATELLOFEMORAL LIGAMENT RECONSTRUCTION WITH GRACILIS ALLOGRAFT (Left)  Anesthesia type: general    Patient location: PACU  Last vitals:   Vitals Value Taken Time   /67 06/04/21 1537   Temp 36.6  C (97.9  F) 06/04/21 1507   Pulse 70 06/04/21 1540   Resp 15 06/04/21 1528   SpO2 94 % 06/04/21 1540   Vitals shown include unvalidated device data.  Post vital signs: stable  Level of consciousness: awake and responds to simple questions  Post-anesthesia pain: pain controlled  Post-anesthesia nausea and vomiting: no  Pulmonary: unassisted, return to baseline  Cardiovascular: stable and blood pressure at baseline  Hydration: adequate  Anesthetic events: no    QCDR Measures:  ASA# 11 - Naomi-op Cardiac Arrest: ASA11B - Patient did NOT experience unanticipated cardiac arrest  ASA# 12 - Naomi-op Mortality Rate: ASA12B - Patient did NOT die  ASA# 13 - PACU Re-Intubation Rate: ASA13B - Patient did NOT require a new airway mgmt  ASA# 10 - Composite Anes Safety: ASA10A - No serious adverse event    Additional Notes:   Opt out

## 2021-06-26 NOTE — ANESTHESIA PREPROCEDURE EVALUATION
Anesthesia Evaluation      Patient summary reviewed   No history of anesthetic complications     Airway   Mallampati: II  Neck ROM: full   Pulmonary - negative ROS and normal exam    breath sounds clear to auscultation  (+) asthma  a smoker                         Cardiovascular - negative ROS and normal exam  Rhythm: regular  Rate: normal,         Neuro/Psych - negative ROS     Endo/Other - negative ROS   (+) obesity,      GI/Hepatic/Renal - negative ROS   (+)   chronic renal disease,           Dental - normal exam                        Anesthesia Plan  Planned anesthetic: peripheral nerve block and general endotracheal    ASA 3   Induction: intravenous   Anesthetic plan and risks discussed with: patient

## 2021-07-03 NOTE — ADDENDUM NOTE
Addendum Note by Iker Damon RN at 8/13/2018 11:09 AM     Author: Iker Damon RN Service: -- Author Type: Registered Nurse    Filed: 8/13/2018 11:09 AM Encounter Date: 8/13/2018 Status: Signed    : Iker Damon RN (Registered Nurse)    Addended by: IKER DAMON on: 8/13/2018 11:09 AM        Modules accepted: Orders

## 2021-07-06 VITALS — BODY MASS INDEX: 49.37 KG/M2 | WEIGHT: 293 LBS | BODY MASS INDEX: 49.37 KG/M2 | HEIGHT: 71 IN

## 2021-09-19 ENCOUNTER — HEALTH MAINTENANCE LETTER (OUTPATIENT)
Age: 36
End: 2021-09-19

## 2022-01-12 VITALS — BODY MASS INDEX: 41.02 KG/M2 | WEIGHT: 293 LBS | HEIGHT: 71 IN

## 2022-01-18 VITALS — HEIGHT: 71 IN | WEIGHT: 293 LBS | BODY MASS INDEX: 41.02 KG/M2

## 2022-01-18 VITALS — HEIGHT: 71 IN | BODY MASS INDEX: 41.02 KG/M2 | WEIGHT: 293 LBS

## 2022-06-26 ENCOUNTER — HEALTH MAINTENANCE LETTER (OUTPATIENT)
Age: 37
End: 2022-06-26

## 2022-11-21 ENCOUNTER — HEALTH MAINTENANCE LETTER (OUTPATIENT)
Age: 37
End: 2022-11-21

## 2023-07-08 ENCOUNTER — HEALTH MAINTENANCE LETTER (OUTPATIENT)
Age: 38
End: 2023-07-08

## 2025-04-02 NOTE — PROGRESS NOTES
[facility] Non Surgical Follow up Visit   Date of visit: 2020  Physician: Kaylee Soto MD  Primary Care is Yael Ramires DO.  Pam Carrillo   35 y.o.  female    Initial Weight: 389#? Prior to RNY in . Weght brian 198#. She came back to us in 2020 after being lost to follow up with weight regain. Weight at that time was 346#    Today's Weight:   Wt Readings from Last 1 Encounters:   20 (!) 343 lb (155.6 kg)     There is no height or weight on file to calculate BMI.  No data recorded     Assessment and Plan   Assessment: Pam is a 35 y.o. year old female who presents for medical weight management.      Plan:    1. Postoperative malabsorption  Patient is taking all vitamins as directed. She was reminded to slow down, chew foods thoroughly, and to avoid liquids within 30 minutes of solids. Written information was given. She was congratulated on her success thus far.    2. Morbid obesity (H)  Patient was congratulated on her success thus far. Healthy habits to assist with further weight loss were discussed. Written information was given. She is ready to get back on track. She will restart the phentermine       Follow up in 3 months with myself           INTERIM HISTORY  Phentermine was prescribed at patient's visit in 2020. She tolerated the phentermine and felt it helped to control her appetite. It helped her to decrease snacking. She has been out of it for about a month. She continues to smoke. She is now taking all of her vitamins.  Her mother  in May and Tor was a little tough on her     DIETARY HISTORY  Meals Per Day: 3  Eating Protein First?: yes  Food Diary: B:cottage cheese with fruit or oatmeal or hard boiled or chicken salad or soup L:salad or soup with extra chicken or sandwich with sandwich thins D:premiere protein shakes with sugar free pudding and almond milk  Snacks Per Day: more recently  Typical Snack: granola or carrots or cucumbers  Fluid  Called patient, she will be here at 3:30pm.   Intake: 64 oz  Portion Control: yes  Calorie Containing Beverages: protein shakes  Typical Protein Food Choices: meat, protein shakes  Choosing Whole Grains: sometimes  Meals at Restaurant per week:not discussed    Positive Changes Since Last Visit: she was exercising more, snacking less  Struggling With: off track recently    Knowledgeable in Reading Food Labels: yes  Getting Adequate Protein: yes  Sleeping 7-8 hours/day not discussed  Stress management exercise    PHYSICAL ACTIVITY PATTERNS:  Cardiovascular: she was doing [employer] boxing videos, off track recently  Strength Training: none    REVIEW OF SYSTEMS  GENERAL/CONSTITUTIONAL:  Fatigue: not discussed  HEENT:  Vision changes, glaucoma: no  CARDIOVASCULAR:  Chest Pain with Exertion: no  PULMONARY:  Dyspnea on exertion: yes  NEUROLOGIC:  Paresthesias: no  PSYCHIATRIC:  Moods: relatively stable, holidays were hard on her         Patient Profile   Social History     Social History Narrative     Not on file        Past Medical History   Past Medical History:   Diagnosis Date     Arthritis     left knee     Asthma      Back pain      Fatty liver      Gallbladder sludge      Jaw clicking      Kidney stones      Kidney stones      Morbid obesity (H)      Neurogenic claudication due to lumbar spinal stenosis      Panic disorder      Tobacco use      Patient Active Problem List   Diagnosis     S/P bariatric surgery     Abdominal pain     Tobacco use     Postoperative malabsorption     Diarrhea     Kidney stones     Gallbladder sludge     Fatty liver disease, nonalcoholic     Morbid obesity (H)     Current Outpatient Medications   Medication Sig     albuterol (PROAIR HFA;PROVENTIL HFA;VENTOLIN HFA) 90 mcg/actuation inhaler Inhale 2 puffs every 6 (six) hours as needed for wheezing.     buPROPion (WELLBUTRIN SR) 150 MG 12 hr tablet Take 1 tablet (150 mg total) by mouth 2 (two) times a day. Take it once a day for the first week then increase     buPROPion (WELLBUTRIN XL) 300  "MG 24 hr tablet Take 1 tablet (300 mg total) by mouth daily.     cholecalciferol, vitamin D3, (VITAMIN D3) 5,000 unit Tab Take 1 tablet (5,000 Units total) by mouth daily.     cyanocobalamin, vitamin B-12, 1,000 mcg Subl Place 1 tablet (1,000 mcg total) under the tongue daily.     diazePAM (VALIUM) 10 MG tablet TK 1 T PO 60 MINUTES PRIOR TO PROCEDURE UTD     LORazepam (ATIVAN) 0.5 MG tablet      methocarbamoL (ROBAXIN) 750 MG tablet TK 1 T PO Q 6 H PRF MUSCLE SPASM     naltrexone (DEPADE) 50 mg tablet      nystatin (MYCOSTATIN) powder Apply topically 2 (two) times a day.     pediatric multivitamin-iron (POLY-VI-SOL WITH IRON) chewable tablet Chew 2 tablets daily.     varenicline (CHANTIX STARTING MONTH BOX) 0.5 mg (11)- 1 mg (42) tablet 1 wk before you stop smoking take 0.5mg daily on days 1-3, 0.5mg 2 times each day on days 4-7, then 1mg 2 times daily.       Past Surgical History  She has a past surgical history that includes Knee arthroscopy (Left, 10/2002); left patellar reallignment (2002 & 2003); Tonsillectomy; Wrist fracture surgery (Left, 1993); Dilation and curettage of uterus (2007); pr lap gastric bypass/ashu-en-y (N/A, 12/17/2014); Ashu-en-y procedure (12/17/2014); back pain; and Posterior laminectomy / decompression lumbar spine (03/2020).     Examination   There were no vitals taken for this visit.  Height: 5' 11\" (1.803 m) (8/19/2020  8:00 AM)  Initial Weight: 346 lbs (8/19/2020  8:00 AM)  Weight: (!) 343 lb (155.6 kg) (8/19/2020  8:00 AM)  Weight loss from initial: 3 (8/19/2020  8:00 AM)  % Weight loss: 0.87 % (8/19/2020  8:00 AM)  BMI (Calculated): 47.9 (8/19/2020  8:00 AM)    General:  Alert and ambulatory, NAD  HEENT: Moist mucous membranes, neck is without LAD  Pulmonary:  Normal respiratory effort, no cough, no audible wheezes/crackles.  CV:  Regular rate and Rhythm, no murmurs  Pscyh/Mood: stable         Counseling:   We reviewed the important post op bariatric recommendations:  -eating 3 meals " daily  -eating protein first, getting >60gm protein daily  -eating slowly, chewing food well  -avoiding/limiting calorie containing beverages  -limiting starchy vegetables and carbohydrates, choosing wheat, not white with breads,   crackers, pastas, no, bagels, tortillas, rice  -limiting restaurant or cafeteria eating to twice a week or less    We discussed the importance of restorative sleep and stress management in maintaining a healthy weight.  We discussed the National Weight Control Registry healthy weight maintenance strategies and ways to optimize metabolism.  We discussed the importance of physical activity including cardiovascular and strength training in maintaining a healthier weight.    > 25 min spent with patient, > 50% spent in counseling         LUCRETIA Soto MD  [facility].    Much or all of the text in this note was generated through the use of Dragon Dictate voice-to-text software. Errors in spelling or words which seem out of context are unintentional. Sound alike errors, in particular, may have escaped editing.